# Patient Record
Sex: MALE | Race: OTHER | URBAN - METROPOLITAN AREA
[De-identification: names, ages, dates, MRNs, and addresses within clinical notes are randomized per-mention and may not be internally consistent; named-entity substitution may affect disease eponyms.]

---
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
## 2025-01-06 NOTE — PROCEDURE: TREATMENT REGIMEN
----- Message from Dr. Shira Lopez DO sent at 1/4/2025  7:07 PM EST -----  Culture neg for gc/chlamydia  
Left vm  notifying of normal results. Encouraged to call back if any questions and or concerns  
Initiate Treatment: Doxycycline 100 mg QHS \\nbenzoyl peroxide 5% topical cleanser BID\\nClindamycin 1% topical solution BID
Otc Regimen: Sunscreen SPF 52+
Detail Level: Zone
104.3